# Patient Record
Sex: FEMALE | Race: WHITE
[De-identification: names, ages, dates, MRNs, and addresses within clinical notes are randomized per-mention and may not be internally consistent; named-entity substitution may affect disease eponyms.]

---

## 2021-11-04 ENCOUNTER — HOSPITAL ENCOUNTER (OUTPATIENT)
Dept: HOSPITAL 63 - MAMMO | Age: 56
End: 2021-11-04
Attending: FAMILY MEDICINE
Payer: COMMERCIAL

## 2021-11-04 DIAGNOSIS — Z12.31: Primary | ICD-10-CM

## 2021-11-04 PROCEDURE — 77063 BREAST TOMOSYNTHESIS BI: CPT

## 2021-11-04 PROCEDURE — 77067 SCR MAMMO BI INCL CAD: CPT

## 2021-11-15 NOTE — RAD
BILATERAL  DIGITAL SCREENING 2-D AND 3-D MAMMOGRAM



INDICATION: Routine screening.



COMPARISON:  8/21/2017, 11/13/2019, 2/21/2014.



Interpretation was made using CAD.



FINDINGS:

Breast Density: There are scattered areas of fibroglandular density. 



RIGHT BREAST: There is a partially circumscribed 9 mm asymmetry in the slightly outer right breast mi
ddle third.



LEFT BREAST: No suspicious masses, calcifications or areas of architectural distortion are seen.



IMPRESSION:

1.  Partially circumscribed 9 mm asymmetry slightly outer right breast, middle third.





ASSESSMENT: BI-RADS 0: Incomplete -- Need Additional Imaging Evaluation and/or Prior Mammograms for C
omparison.



RECOMMENDATION: Diagnostic right breast mammogram with spot compression views and ultrasound if indic
ated.







The facility will notify the patient of the results via mail. Patient information will be entered int
o the mammography reminder system with a target recall date for the next mammogram. A reminder letter
 will be generated by the facility.





Electronically signed by: Michael Feliciano MD (11/15/2021 9:22 AM) [id number]

## 2021-12-13 ENCOUNTER — HOSPITAL ENCOUNTER (OUTPATIENT)
Dept: HOSPITAL 63 - MAMMO | Age: 56
End: 2021-12-13
Attending: FAMILY MEDICINE
Payer: COMMERCIAL

## 2021-12-13 DIAGNOSIS — N63.10: Primary | ICD-10-CM

## 2021-12-13 PROCEDURE — 77065 DX MAMMO INCL CAD UNI: CPT

## 2021-12-13 PROCEDURE — 76641 ULTRASOUND BREAST COMPLETE: CPT

## 2021-12-13 NOTE — RAD
PROCEDURE:  US BREAST RT, MG DIAGNOSTICUNILAT MAMMO



HISTORY: The patient is 56 years old and is seen for Reason: RT BREAST CALL BACK [facility] MAMM 11-4-21 / 
Spl. Instructions:  / History: .



COMPARISON: November 4, 2021 



TECHNIQUE: Right breast ML and spot compression views. Targeted right breast ultrasound was also perf
ormed.



DENSITY: There are scattered fibroglandular densities.



FINDINGS:

Right mammogram: Well-circumscribed mass within the right breast measures 0.7 cm on ML view and appro
ximately 4.4 cm from the nipple. Corresponding with findings seen previously.



Right breast ultrasound: Well-circumscribed hypoechoic lesion within the right breast 10:00 position 
5 cm from the nipple measures 0.5 x 0.3 x 0.4 cm. Corresponds with mammographic finding.



IMPRESSION: 

1.  Hypoechoic mass within the right breast corresponding to mammographic finding, may represent comp
licated cyst. Recommend 6 month ultrasound follow-up.



BI-RADS Category 3:  Probably Benign.



Patient entered into a reminder system for annual screening mammogram.





Electronically signed by: Jackson Valente DO (12/13/2021 2:06 PM) [id number]

## 2022-05-12 ENCOUNTER — HOSPITAL ENCOUNTER (OUTPATIENT)
Dept: HOSPITAL 63 - US | Age: 57
End: 2022-05-12
Attending: FAMILY MEDICINE
Payer: COMMERCIAL

## 2022-05-12 DIAGNOSIS — N60.01: Primary | ICD-10-CM

## 2022-05-12 DIAGNOSIS — N60.41: ICD-10-CM

## 2022-05-12 PROCEDURE — 76641 ULTRASOUND BREAST COMPLETE: CPT

## 2022-05-12 NOTE — RAD
EXAM: Right breast sonogram.



HISTORY: 56-year-old female presents for follow-up evaluation of findings within the right breast dem
onstrated on a sonogram performed 12/13/2021.



TECHNIQUE: Sonographic of the right breast targeted to the site of prior sonographic abnormality was 
performed.



COMPARISON: 12/13/2021.



FINDINGS: There is a 6 mm oval hypoechoic lesion without internal blood flow or posterior shadowing w
ithin the 10:00 position 5 cm from the nipple. The imaging appearance favors a cyst. This is more sim
ple in appearance compared to the prior exam. There is no suspicious lesion component. There are dila
raul ducts within the anterior right breast. There is no new suspicious sonographic finding.



IMPRESSION:

1. 6 mm cyst at the 10:00 position 5 cm from the nipple, stable in size and slightly more simple in a
ppearance compared to the prior exam.

2. Dilated ducts within the anterior right breast. No intraductal lesion is seen.

3. No new suspicious sonographic finding.

4. BI-RADS Category 2: Benign finding(s). The patient will be due for bilateral mammography in 6 cristin
hs according to a previously established mammography interval.



Electronically signed by: Eva Ortega MD (5/12/2022 1:13 PM) [id number]